# Patient Record
Sex: FEMALE | Race: WHITE | NOT HISPANIC OR LATINO | Employment: OTHER | URBAN - METROPOLITAN AREA
[De-identification: names, ages, dates, MRNs, and addresses within clinical notes are randomized per-mention and may not be internally consistent; named-entity substitution may affect disease eponyms.]

---

## 2022-06-21 ENCOUNTER — HOSPITAL ENCOUNTER (EMERGENCY)
Facility: CLINIC | Age: 47
Discharge: HOME | End: 2022-06-21
Attending: EMERGENCY MEDICINE | Admitting: EMERGENCY MEDICINE
Payer: COMMERCIAL

## 2022-06-21 VITALS
TEMPERATURE: 96.7 F | DIASTOLIC BLOOD PRESSURE: 94 MMHG | RESPIRATION RATE: 18 BRPM | SYSTOLIC BLOOD PRESSURE: 138 MMHG | HEART RATE: 104 BPM | OXYGEN SATURATION: 99 %

## 2022-06-21 DIAGNOSIS — R07.9 CHEST PAIN, UNSPECIFIED TYPE: ICD-10-CM

## 2022-06-21 PROBLEM — E03.9 HYPOTHYROIDISM (ACQUIRED): Status: ACTIVE | Noted: 2018-05-08

## 2022-06-21 PROBLEM — N92.0 MENORRHAGIA WITH REGULAR CYCLE: Status: ACTIVE | Noted: 2022-06-21

## 2022-06-21 PROBLEM — K66.0 LOWER ABDOMINAL ADHESIONS: Status: ACTIVE | Noted: 2022-06-21

## 2022-06-21 PROBLEM — R73.03 PREDIABETES: Status: ACTIVE | Noted: 2018-05-08

## 2022-06-21 PROBLEM — D25.1 INTRAMURAL LEIOMYOMA OF UTERUS: Status: ACTIVE | Noted: 2020-11-17

## 2022-06-21 LAB
ALBUMIN SERPL-MCNC: 3.4 G/DL (ref 3.4–5)
ALP SERPL-CCNC: 73 U/L (ref 40–150)
ALT SERPL W P-5'-P-CCNC: 16 U/L (ref 0–50)
ANION GAP SERPL CALCULATED.3IONS-SCNC: 6 MMOL/L (ref 3–14)
APTT PPP: 28 SECONDS (ref 22–38)
AST SERPL W P-5'-P-CCNC: 11 U/L (ref 0–45)
BASOPHILS # BLD AUTO: 0.1 10E3/UL (ref 0–0.2)
BASOPHILS NFR BLD AUTO: 1 %
BILIRUB SERPL-MCNC: 0.2 MG/DL (ref 0.2–1.3)
BUN SERPL-MCNC: 14 MG/DL (ref 7–30)
CALCIUM SERPL-MCNC: 9 MG/DL (ref 8.5–10.1)
CHLORIDE BLD-SCNC: 105 MMOL/L (ref 94–109)
CO2 SERPL-SCNC: 28 MMOL/L (ref 20–32)
CREAT SERPL-MCNC: 0.72 MG/DL (ref 0.52–1.04)
EOSINOPHIL # BLD AUTO: 0.1 10E3/UL (ref 0–0.7)
EOSINOPHIL NFR BLD AUTO: 1 %
ERYTHROCYTE [DISTWIDTH] IN BLOOD BY AUTOMATED COUNT: 13.6 % (ref 10–15)
GFR SERPL CREATININE-BSD FRML MDRD: >90 ML/MIN/1.73M2
GLUCOSE BLD-MCNC: 115 MG/DL (ref 70–99)
GLUCOSE BLDC GLUCOMTR-MCNC: 116 MG/DL (ref 70–99)
HCT VFR BLD AUTO: 44.2 % (ref 35–47)
HGB BLD-MCNC: 14.6 G/DL (ref 11.7–15.7)
IMM GRANULOCYTES # BLD: 0 10E3/UL
IMM GRANULOCYTES NFR BLD: 0 %
INR PPP: 0.99 (ref 0.85–1.15)
LYMPHOCYTES # BLD AUTO: 3.3 10E3/UL (ref 0.8–5.3)
LYMPHOCYTES NFR BLD AUTO: 33 %
MCH RBC QN AUTO: 28.8 PG (ref 26.5–33)
MCHC RBC AUTO-ENTMCNC: 33 G/DL (ref 31.5–36.5)
MCV RBC AUTO: 87 FL (ref 78–100)
MONOCYTES # BLD AUTO: 0.6 10E3/UL (ref 0–1.3)
MONOCYTES NFR BLD AUTO: 6 %
NEUTROPHILS # BLD AUTO: 5.9 10E3/UL (ref 1.6–8.3)
NEUTROPHILS NFR BLD AUTO: 59 %
NRBC # BLD AUTO: 0 10E3/UL
NRBC BLD AUTO-RTO: 0 /100
PLATELET # BLD AUTO: 322 10E3/UL (ref 150–450)
POTASSIUM BLD-SCNC: 3.7 MMOL/L (ref 3.4–5.3)
PROT SERPL-MCNC: 7.7 G/DL (ref 6.8–8.8)
RBC # BLD AUTO: 5.07 10E6/UL (ref 3.8–5.2)
SODIUM SERPL-SCNC: 139 MMOL/L (ref 133–144)
TROPONIN I SERPL HS-MCNC: 6 NG/L
TSH SERPL DL<=0.005 MIU/L-ACNC: 3.36 MU/L (ref 0.4–4)
WBC # BLD AUTO: 10 10E3/UL (ref 4–11)

## 2022-06-21 PROCEDURE — 85610 PROTHROMBIN TIME: CPT | Performed by: EMERGENCY MEDICINE

## 2022-06-21 PROCEDURE — 84443 ASSAY THYROID STIM HORMONE: CPT | Performed by: EMERGENCY MEDICINE

## 2022-06-21 PROCEDURE — 93005 ELECTROCARDIOGRAM TRACING: CPT | Performed by: EMERGENCY MEDICINE

## 2022-06-21 PROCEDURE — 99284 EMERGENCY DEPT VISIT MOD MDM: CPT | Performed by: EMERGENCY MEDICINE

## 2022-06-21 PROCEDURE — 80053 COMPREHEN METABOLIC PANEL: CPT | Performed by: EMERGENCY MEDICINE

## 2022-06-21 PROCEDURE — 93010 ELECTROCARDIOGRAM REPORT: CPT | Performed by: EMERGENCY MEDICINE

## 2022-06-21 PROCEDURE — 85730 THROMBOPLASTIN TIME PARTIAL: CPT | Performed by: EMERGENCY MEDICINE

## 2022-06-21 PROCEDURE — 36415 COLL VENOUS BLD VENIPUNCTURE: CPT | Performed by: EMERGENCY MEDICINE

## 2022-06-21 PROCEDURE — 84484 ASSAY OF TROPONIN QUANT: CPT | Performed by: EMERGENCY MEDICINE

## 2022-06-21 PROCEDURE — 99284 EMERGENCY DEPT VISIT MOD MDM: CPT | Mod: 25 | Performed by: EMERGENCY MEDICINE

## 2022-06-21 PROCEDURE — 85025 COMPLETE CBC W/AUTO DIFF WBC: CPT | Performed by: EMERGENCY MEDICINE

## 2022-06-21 RX ORDER — KETOROLAC TROMETHAMINE 15 MG/ML
15 INJECTION, SOLUTION INTRAMUSCULAR; INTRAVENOUS ONCE
Status: DISCONTINUED | OUTPATIENT
Start: 2022-06-21 | End: 2022-06-22 | Stop reason: HOSPADM

## 2022-06-21 RX ORDER — IOPAMIDOL 755 MG/ML
70 INJECTION, SOLUTION INTRAVASCULAR ONCE
Status: DISCONTINUED | OUTPATIENT
Start: 2022-06-21 | End: 2022-06-22 | Stop reason: HOSPADM

## 2022-06-21 RX ORDER — SODIUM CHLORIDE 9 MG/ML
INJECTION, SOLUTION INTRAVENOUS CONTINUOUS PRN
Status: DISCONTINUED | OUTPATIENT
Start: 2022-06-21 | End: 2022-06-22 | Stop reason: HOSPADM

## 2022-06-21 ASSESSMENT — ENCOUNTER SYMPTOMS
ACTIVITY CHANGE: 1
APPETITE CHANGE: 0
CONFUSION: 0
HEADACHES: 0
NERVOUS/ANXIOUS: 0
CHILLS: 1
ABDOMINAL PAIN: 0
SORE THROAT: 0
SHORTNESS OF BREATH: 1
FEVER: 0
DIARRHEA: 0
COUGH: 0
PALPITATIONS: 0
HYPERACTIVE: 1
SLEEP DISTURBANCE: 1
NAUSEA: 0
FACIAL ASYMMETRY: 0
BACK PAIN: 0
DIAPHORESIS: 1
LIGHT-HEADEDNESS: 0
NUMBNESS: 1
VOMITING: 0
RHINORRHEA: 0
CHEST TIGHTNESS: 0
SPEECH DIFFICULTY: 1
DIZZINESS: 0
FATIGUE: 0
WEAKNESS: 0
DYSURIA: 0

## 2022-06-22 LAB
HOLD SPECIMEN: NORMAL

## 2022-06-22 NOTE — ED TRIAGE NOTES
Patient states that she is on day 4 of left sided chest pain. Hx of stroke and stents placed. Pt is currently prescribed blood thinners but refuses to take them. 4 baby aspirin taken a few hours ago and Topamax taken. Slurred speech started around 2245. Other sx started at approximately 2100.     Triage Assessment     Row Name 06/21/22 4335       Triage Assessment (Adult)    Airway WDL WDL       Respiratory WDL    Respiratory WDL WDL       Skin Circulation/Temperature WDL    Skin Circulation/Temperature WDL WDL       Cardiac WDL    Cardiac WDL X;chest pain       Chest Pain Assessment    Chest Pain Location anterior chest, left    Chest Pain Radiation arm    Character sharp;pressure    Duration constant, worse with breathing       Peripheral/Neurovascular WDL    Peripheral Neurovascular WDL WDL       Cognitive/Neuro/Behavioral WDL    Cognitive/Neuro/Behavioral WDL X    Speech slurred       Andrew Coma Scale    Best Eye Response 4-->(E4) spontaneous    Best Motor Response 6-->(M6) obeys commands    Best Verbal Response 5-->(V5) oriented    Andrew Coma Scale Score 15

## 2022-06-22 NOTE — ED PROVIDER NOTES
"  History     Chief Complaint   Patient presents with     Chest Pain     HPI  Shea Hoyos is a 47 year old female with a history of bipolar disorder, GERD, obesity, asthma and smoking, and coronary disease with stents earlier this year presenting for evaluation of roughly 4 to 5 days of left-sided chest pain with dyspnea.  Patient admits to being off of her prescribed medications for quite some time.  She reports she is a previous drug addict and \"does not like to take pills\".  She has been off all of her medications including omeprazole, lithium, levothyroxine, and prazosin.  Patient reports she has been having left shoulder pain for several weeks which seems to be worse currently.  Pain is in her left shoulder and back area as well as her left-sided chest on her breast.  Pain has improved when she would take aspirin at home or ibuprofen or Tylenol.  Denies cough, fevers, or chills.  Reports continuous pain and difficulty breathing which is worse with movement or deep inspiration.  Denies history of blood clots.  Denies lower extremity pain or swelling.  Did notice some slurring in her speech and left-sided weakness over the past few days.  Slurred speech has been intermittent but seem to worsen again before arrival in the ED.  Patient last took 324 mg chewable aspirin just before coming to the ED.  Patient reports she was feeling numbness in her left arm but that is now improved and back to normal.  Patient reportedly has not been sleeping much for the past 4 to 5 days.  She reports at most 8 hours of sleep over this timeframe.  Friend who is with her reports she has been very active and focused and has barely slept.    Allergies:  Allergies   Allergen Reactions     Augmentin Rash     Vomiting     Cipro [Ciprofloxacin] Rash     Codeine Rash     Morphine Rash     Vicodin [Acetaminophen] Rash     Zithromax [Azithromycin Dihydrate] Rash       Problem List:    Patient Active Problem List    Diagnosis Date Noted "     Lower abdominal adhesions 06/21/2022     Priority: Medium     Menorrhagia with regular cycle 06/21/2022     Priority: Medium     Intramural leiomyoma of uterus 11/17/2020     Priority: Medium     Hypothyroidism (acquired) 05/08/2018     Priority: Medium     Prediabetes 05/08/2018     Priority: Medium     Major depressive disorder, recurrent episode, severe with mood-congruent psychotic features (H) 11/13/2015     Priority: Medium     Post traumatic stress disorder (PTSD) 11/13/2015     Priority: Medium     Adenoma of left adrenal gland 07/31/2015     Priority: Medium     Multinodular goiter 04/19/2014     Priority: Medium     Mild intermittent asthma 02/18/2014     Priority: Medium     Migraine with aura 02/07/2013     Priority: Medium     Nerve damage 09/12/2012     Priority: Medium     Anxiety state 07/13/2012     Priority: Medium     Urinary incontinence 09/26/2011     Priority: Medium     Bipolar I disorder (H) 04/01/2011     Priority: Medium     GERD (gastroesophageal reflux disease) 04/01/2011     Priority: Medium     CARDIOVASCULAR SCREENING; LDL GOAL LESS THAN 160 10/31/2010     Priority: Medium     Adult BMI >=40 kg/sq m 02/26/2010     Priority: Medium     Tobacco abuse 02/26/2010     Priority: Medium     History of contraception 02/26/2010     Priority: Medium     Failed tubal ligation. Pt had tubal ligation in 2001. Recent pregnancy in 2009 that ended in miscarriage. Pt now using condoms and spermicide. Undecided about future fertility plans at this time, considering tubal reversal.          Past Medical History:    Past Medical History:   Diagnosis Date     Bipolar affective disorder (H)      Bronchitis      Depression      GERD (gastroesophageal reflux disease)      Increased BMI      reflux        Past Surgical History:    Past Surgical History:   Procedure Laterality Date     CHOLECYSTECTOMY, LAPOROSCOPIC       HC DILATION/CURETTAGE DIAG/THER NON OB       HC TOOTH EXTRACTION W/FORCEP       TUBAL  LIGATION       ZZC APPENDECTOMY       ZZC  DELIVERY ONLY  , 2001    x2       Family History:    Family History   Problem Relation Age of Onset     Respiratory Mother         copd     Hypertension Mother      Allergies Mother      Diabetes Mother      Obesity Mother      Psychotic Disorder Mother         bipolar     Hypertension Father      Allergies Father      C.A.D. Father      Diabetes Father      Alcohol/Drug Father      C.A.D. Maternal Grandmother      Cerebrovascular Disease Maternal Grandmother      Alcohol/Drug Maternal Grandmother      Alzheimer Disease Maternal Grandmother      Arthritis Maternal Grandmother      Blood Disease Maternal Grandmother      C.A.D. Maternal Grandfather      Cerebrovascular Disease Maternal Grandfather      Alcohol/Drug Maternal Grandfather      C.A.D. Paternal Grandmother      Cerebrovascular Disease Paternal Grandmother      Breast Cancer Paternal Grandmother      Alcohol/Drug Paternal Grandmother      C.A.D. Paternal Grandfather      Cerebrovascular Disease Paternal Grandfather      Alcohol/Drug Paternal Grandfather      Asthma Sister      Depression Sister         bipolar     Thyroid Disease Sister      Cancer Sister         ovarian and cervical     Depression Sister         bipolar       Social History:  Marital Status:  Single [1]  Social History     Tobacco Use     Smoking status: Current Every Day Smoker     Packs/day: 0.50     Years: 22.00     Pack years: 11.00     Types: Cigarettes   Substance Use Topics     Alcohol use: Yes     Comment: occasional, holidays     Drug use: No        Medications:    IBUPROFEN 600 MG OR TABS  LEXAPRO 20 MG OR TABS  MULTIVITAMIN TABS   OR  NEXIUM 20 MG OR CPDR          Review of Systems   Constitutional: Positive for activity change (Increased activity), chills and diaphoresis. Negative for appetite change, fatigue and fever.        Intermittent sweats and chills   HENT: Negative for congestion, rhinorrhea and sore throat.     Respiratory: Positive for shortness of breath. Negative for cough and chest tightness.    Cardiovascular: Positive for chest pain. Negative for palpitations and leg swelling.   Gastrointestinal: Negative for abdominal pain, diarrhea, nausea and vomiting.   Genitourinary: Negative for decreased urine volume and dysuria.   Musculoskeletal: Negative for back pain.   Skin: Negative for rash.   Neurological: Positive for speech difficulty (Noticed some slurred speech tonight) and numbness (Intermittent left-sided). Negative for dizziness, syncope, facial asymmetry, weakness, light-headedness and headaches.   Psychiatric/Behavioral: Positive for sleep disturbance (Not sleeping for several days). Negative for confusion. The patient is hyperactive. The patient is not nervous/anxious.    All other systems reviewed and are negative.      Physical Exam   BP: (!) 138/94  Pulse: 104  Temp: (!) 96.7  F (35.9  C)  Resp: 18  SpO2: 99 %      Physical Exam  Vitals and nursing note reviewed.   Constitutional:       Appearance: She is obese. She is not diaphoretic.      Comments: Awake and alert, able to provide much of the history, friend provides some history as well.   HENT:      Head: Normocephalic.      Nose: Nose normal.      Mouth/Throat:      Pharynx: No posterior oropharyngeal erythema.      Comments: Mildly dry oral mucous membranes  Eyes:      Conjunctiva/sclera: Conjunctivae normal.   Cardiovascular:      Rate and Rhythm: Normal rate and regular rhythm.      Pulses: Normal pulses.   Pulmonary:      Effort: Pulmonary effort is normal.      Breath sounds: Wheezing (faint) present. No rhonchi.   Abdominal:      Palpations: Abdomen is soft.      Tenderness: There is no abdominal tenderness. There is no guarding.   Musculoskeletal:      Cervical back: Normal range of motion.      Right lower leg: No edema.      Left lower leg: No edema.   Skin:     General: Skin is warm and dry.      Capillary Refill: Capillary refill takes  less than 2 seconds.   Neurological:      Mental Status: She is alert and oriented to person, place, and time.   Psychiatric:         Mood and Affect: Mood is anxious.         Speech: Speech is rapid and pressured.         Behavior: Behavior is hyperactive. Behavior is cooperative.         ED Course                 Procedures              EKG Interpretation:      Interpreted by Suhas Arcos MD  Time reviewed: 2312  Symptoms at time of EKG: chest pain   Rhythm: normal sinus   Rate: normal  Axis: normal  Ectopy: unifocal pvc  Conduction: normal  ST Segments/ T Waves: No ST-T wave changes  Q Waves: none  Comparison to prior: No old EKG available    Clinical Impression: Sinus rhythm with a PVC                 Results for orders placed or performed during the hospital encounter of 06/21/22 (from the past 24 hour(s))   [place] Draw    Narrative    The following orders were created for panel order [place] Draw.  Procedure                               Abnormality         Status                     ---------                               -----------         ------                     Extra Red Top Tube[[id number]]                               In process                 Extra Green Top (Lithium...[[id number]]                      In process                 Extra Purple Top Tube[[id number]]                            In process                   Please view results for these tests on the individual orders.   CBC with platelets, differential    Narrative    The following orders were created for panel order CBC with platelets, differential.  Procedure                               Abnormality         Status                     ---------                               -----------         ------                     CBC with platelets and d...[[id number]]                      Final result                 Please view results for these tests on the individual orders.   Comprehensive metabolic panel   Result Value Ref Range     Sodium 139 133 - 144 mmol/L    Potassium 3.7 3.4 - 5.3 mmol/L    Chloride 105 94 - 109 mmol/L    Carbon Dioxide (CO2)      Anion Gap      Urea Nitrogen      Creatinine      Calcium      Glucose      Alkaline Phosphatase      AST      ALT      Protein Total      Albumin      Bilirubin Total      GFR Estimate     CBC with platelets and differential   Result Value Ref Range    WBC Count 10.0 4.0 - 11.0 10e3/uL    RBC Count 5.07 3.80 - 5.20 10e6/uL    Hemoglobin 14.6 11.7 - 15.7 g/dL    Hematocrit 44.2 35.0 - 47.0 %    MCV 87 78 - 100 fL    MCH 28.8 26.5 - 33.0 pg    MCHC 33.0 31.5 - 36.5 g/dL    RDW 13.6 10.0 - 15.0 %    Platelet Count 322 150 - 450 10e3/uL    % Neutrophils 59 %    % Lymphocytes 33 %    % Monocytes 6 %    % Eosinophils 1 %    % Basophils 1 %    % Immature Granulocytes 0 %    NRBCs per 100 WBC 0 <1 /100    Absolute Neutrophils 5.9 1.6 - 8.3 10e3/uL    Absolute Lymphocytes 3.3 0.8 - 5.3 10e3/uL    Absolute Monocytes 0.6 0.0 - 1.3 10e3/uL    Absolute Eosinophils 0.1 0.0 - 0.7 10e3/uL    Absolute Basophils 0.1 0.0 - 0.2 10e3/uL    Absolute Immature Granulocytes 0.0 <=0.4 10e3/uL    Absolute NRBCs 0.0 10e3/uL   INR   Result Value Ref Range    INR 0.99 0.85 - 1.15   Partial thromboplastin time   Result Value Ref Range    aPTT 28 22 - 38 Seconds   Glucose by meter   Result Value Ref Range    GLUCOSE BY METER POCT 116 (H) 70 - 99 mg/dL       Medications   sodium chloride 0.9% infusion (has no administration in time range)   iopamidol (ISOVUE-370) solution 70 mL (has no administration in time range)   sodium chloride 0.9 % bag 500mL for CT scan flush use (has no administration in time range)   pantoprazole (PROTONIX) IV push injection 40 mg (has no administration in time range)   ketorolac (TORADOL) injection 15 mg (has no administration in time range)   lactated ringers BOLUS 1,000 mL (has no administration in time range)     11:35 PM; advised by nurse the patient wants to leave.  Patient reassessed at  bedside.  Patient is visibly anxious and stating she wants to leave.  She states her chest and arm pain is improved and she cannot stay.  She admits that jesse bipolar is acting up and she needs to get up and leave.  Patient was offered medications to help with her symptoms but she refused.  She is awake and alert.  Patient is oriented and appears to have capacity and understanding of her current medical condition.  I advised her that we are unable to rule out a heart attack or stroke or any other potential cause of her symptoms at this point.  She expressed understanding as did her friend who is with her.  Patient advised that I think she should stay to complete the work-up to be sure there is nothing more serious going on but patient again insisted on leaving.  Patient advised that she is welcome to return at any time and I encouraged her to return if she has any further concerns.  Initial labs at this point appear normal from what is available.  Troponin still pending.  No white count or left shift.  Normal sodium, chloride, potassium.  Imaging not obtained.    Assessments & Plan (with Medical Decision Making)  47-year-old female with history of bipolar, GERD, obesity, asthma, smoking, coronary disease presenting for evaluation of several days of chest pain with dyspnea and anxiety.  Patient has not been sleeping and appears to be in a manic phase.  She is not taking her medications and has been having chest discomfort which appears reproducible with physical movement or palpation.  Possibly musculoskeletal.  Screening EKG did not show evidence of an acute heart attack but did show a PVC.  Became more anxious while in the ED and insisted on leaving.  Patient was offered medication to help with her symptoms but she refused.  I asked if there was anything further I can do to help her to feel more comfortable so that we could complete her work-up to be sure there is nothing more serious going on but she declined.   Patient is here with her friend who agrees to stay with her and bring her back should her symptoms worsen or any new or concerning symptoms develop.  Patient leaving AGAINST MEDICAL ADVICE.     I have reviewed the nursing notes.    I have reviewed the findings, diagnosis, plan and need for follow up with the patient.       New Prescriptions    No medications on file       Final diagnoses:   Chest pain, unspecified type       6/21/2022   [facility] EMERGENCY DEPT     Arcos, Suhas Brown MD  06/21/22 [id number]

## 2022-06-22 NOTE — ED TRIAGE NOTES
Left-sided chest pain that radiates down left arm for 4 days and slurred speech for 1 day.  Patient has history of CVA's, chiari malformation.     Triage Assessment     Row Name 06/21/22 2755       Triage Assessment (Adult)    Airway WDL WDL       Respiratory WDL    Respiratory WDL WDL       Skin Circulation/Temperature WDL    Skin Circulation/Temperature WDL WDL       Cardiac WDL    Cardiac WDL X;chest pain       Chest Pain Assessment    Chest Pain Location anterior chest, left    Chest Pain Radiation arm    Character sharp;pressure    Duration constant, worse with breathing       Peripheral/Neurovascular WDL    Peripheral Neurovascular WDL WDL       Cognitive/Neuro/Behavioral WDL    Cognitive/Neuro/Behavioral WDL X    Speech slurred       Ashok Coma Scale    Best Eye Response 4-->(E4) spontaneous    Best Motor Response 6-->(M6) obeys commands    Best Verbal Response 5-->(V5) oriented    Ashok Coma Scale Score 15

## 2024-10-03 ENCOUNTER — HOSPITAL ENCOUNTER (EMERGENCY)
Facility: CLINIC | Age: 49
Discharge: HOME | End: 2024-10-04
Attending: EMERGENCY MEDICINE | Admitting: EMERGENCY MEDICINE

## 2024-10-03 DIAGNOSIS — N39.0 ACUTE UTI: ICD-10-CM

## 2024-10-03 DIAGNOSIS — S22.31XA CLOSED FRACTURE OF ONE RIB OF RIGHT SIDE, INITIAL ENCOUNTER: ICD-10-CM

## 2024-10-03 LAB
ALBUMIN SERPL BCG-MCNC: 3.7 G/DL (ref 3.5–5.2)
ALBUMIN UR-MCNC: NEGATIVE MG/DL
ALP SERPL-CCNC: 73 U/L (ref 40–150)
ALT SERPL W P-5'-P-CCNC: 11 U/L (ref 0–50)
AMORPH CRY #/AREA URNS HPF: ABNORMAL /HPF
ANION GAP SERPL CALCULATED.3IONS-SCNC: 15 MMOL/L (ref 7–15)
APPEARANCE UR: ABNORMAL
AST SERPL W P-5'-P-CCNC: 13 U/L (ref 0–45)
BACTERIA #/AREA URNS HPF: ABNORMAL /HPF
BASOPHILS # BLD AUTO: 0.1 10E3/UL (ref 0–0.2)
BASOPHILS NFR BLD AUTO: 1 %
BILIRUB SERPL-MCNC: 0.2 MG/DL
BILIRUB UR QL STRIP: NEGATIVE
BUN SERPL-MCNC: 13.8 MG/DL (ref 6–20)
CALCIUM SERPL-MCNC: 8.7 MG/DL (ref 8.8–10.4)
CHLORIDE SERPL-SCNC: 99 MMOL/L (ref 98–107)
COLOR UR AUTO: YELLOW
CREAT SERPL-MCNC: 0.82 MG/DL (ref 0.51–0.95)
D DIMER PPP FEU-MCNC: 0.45 UG/ML FEU (ref 0–0.5)
EGFRCR SERPLBLD CKD-EPI 2021: 88 ML/MIN/1.73M2
EOSINOPHIL # BLD AUTO: 0.1 10E3/UL (ref 0–0.7)
EOSINOPHIL NFR BLD AUTO: 1 %
ERYTHROCYTE [DISTWIDTH] IN BLOOD BY AUTOMATED COUNT: 13.2 % (ref 10–15)
GLUCOSE SERPL-MCNC: 121 MG/DL (ref 70–99)
GLUCOSE UR STRIP-MCNC: NEGATIVE MG/DL
HCO3 SERPL-SCNC: 22 MMOL/L (ref 22–29)
HCT VFR BLD AUTO: 38.8 % (ref 35–47)
HGB BLD-MCNC: 12.5 G/DL (ref 11.7–15.7)
HGB UR QL STRIP: ABNORMAL
IMM GRANULOCYTES # BLD: 0 10E3/UL
IMM GRANULOCYTES NFR BLD: 0 %
KETONES UR STRIP-MCNC: NEGATIVE MG/DL
LEUKOCYTE ESTERASE UR QL STRIP: ABNORMAL
LYMPHOCYTES # BLD AUTO: 1.9 10E3/UL (ref 0.8–5.3)
LYMPHOCYTES NFR BLD AUTO: 20 %
MCH RBC QN AUTO: 28.8 PG (ref 26.5–33)
MCHC RBC AUTO-ENTMCNC: 32.2 G/DL (ref 31.5–36.5)
MCV RBC AUTO: 89 FL (ref 78–100)
MONOCYTES # BLD AUTO: 0.7 10E3/UL (ref 0–1.3)
MONOCYTES NFR BLD AUTO: 8 %
MUCOUS THREADS #/AREA URNS LPF: PRESENT /LPF
NEUTROPHILS # BLD AUTO: 6.6 10E3/UL (ref 1.6–8.3)
NEUTROPHILS NFR BLD AUTO: 70 %
NITRATE UR QL: POSITIVE
NRBC # BLD AUTO: 0 10E3/UL
NRBC BLD AUTO-RTO: 0 /100
PH UR STRIP: 6 [PH] (ref 5–7)
PLATELET # BLD AUTO: 407 10E3/UL (ref 150–450)
POTASSIUM SERPL-SCNC: 3.8 MMOL/L (ref 3.4–5.3)
PROT SERPL-MCNC: 7 G/DL (ref 6.4–8.3)
RBC # BLD AUTO: 4.34 10E6/UL (ref 3.8–5.2)
RBC URINE: 6 /HPF
SODIUM SERPL-SCNC: 136 MMOL/L (ref 135–145)
SP GR UR STRIP: 1.01 (ref 1–1.03)
SQUAMOUS EPITHELIAL: 3 /HPF
TROPONIN T SERPL HS-MCNC: <6 NG/L
UROBILINOGEN UR STRIP-MCNC: NORMAL MG/DL
WBC # BLD AUTO: 9.4 10E3/UL (ref 4–11)
WBC CLUMPS #/AREA URNS HPF: PRESENT /HPF
WBC URINE: >182 /HPF

## 2024-10-03 PROCEDURE — 93005 ELECTROCARDIOGRAM TRACING: CPT

## 2024-10-03 PROCEDURE — 36415 COLL VENOUS BLD VENIPUNCTURE: CPT | Performed by: EMERGENCY MEDICINE

## 2024-10-03 PROCEDURE — 85379 FIBRIN DEGRADATION QUANT: CPT | Performed by: EMERGENCY MEDICINE

## 2024-10-03 PROCEDURE — 99285 EMERGENCY DEPT VISIT HI MDM: CPT | Mod: 25

## 2024-10-03 PROCEDURE — 87086 URINE CULTURE/COLONY COUNT: CPT | Performed by: EMERGENCY MEDICINE

## 2024-10-03 PROCEDURE — 85025 COMPLETE CBC W/AUTO DIFF WBC: CPT | Performed by: EMERGENCY MEDICINE

## 2024-10-03 PROCEDURE — 80053 COMPREHEN METABOLIC PANEL: CPT | Performed by: EMERGENCY MEDICINE

## 2024-10-03 PROCEDURE — 87186 SC STD MICRODIL/AGAR DIL: CPT | Performed by: EMERGENCY MEDICINE

## 2024-10-03 PROCEDURE — 84484 ASSAY OF TROPONIN QUANT: CPT | Performed by: EMERGENCY MEDICINE

## 2024-10-03 PROCEDURE — 81001 URINALYSIS AUTO W/SCOPE: CPT | Performed by: EMERGENCY MEDICINE

## 2024-10-03 PROCEDURE — 84703 CHORIONIC GONADOTROPIN ASSAY: CPT | Performed by: EMERGENCY MEDICINE

## 2024-10-03 ASSESSMENT — ACTIVITIES OF DAILY LIVING (ADL): ADLS_ACUITY_SCORE: 35

## 2024-10-04 ENCOUNTER — APPOINTMENT (OUTPATIENT)
Dept: CT IMAGING | Facility: CLINIC | Age: 49
End: 2024-10-04
Attending: EMERGENCY MEDICINE

## 2024-10-04 ENCOUNTER — APPOINTMENT (OUTPATIENT)
Dept: GENERAL RADIOLOGY | Facility: CLINIC | Age: 49
End: 2024-10-04
Attending: EMERGENCY MEDICINE

## 2024-10-04 VITALS
RESPIRATION RATE: 16 BRPM | SYSTOLIC BLOOD PRESSURE: 121 MMHG | DIASTOLIC BLOOD PRESSURE: 86 MMHG | TEMPERATURE: 97.6 F | OXYGEN SATURATION: 99 % | HEART RATE: 89 BPM

## 2024-10-04 LAB
ATRIAL RATE - MUSE: 101 BPM
DIASTOLIC BLOOD PRESSURE - MUSE: NORMAL MMHG
HCG SERPL QL: NEGATIVE
INTERPRETATION ECG - MUSE: NORMAL
P AXIS - MUSE: 10 DEGREES
PR INTERVAL - MUSE: 136 MS
QRS DURATION - MUSE: 80 MS
QT - MUSE: 354 MS
QTC - MUSE: 459 MS
R AXIS - MUSE: 10 DEGREES
SYSTOLIC BLOOD PRESSURE - MUSE: NORMAL MMHG
T AXIS - MUSE: 73 DEGREES
VENTRICULAR RATE- MUSE: 101 BPM

## 2024-10-04 PROCEDURE — 71046 X-RAY EXAM CHEST 2 VIEWS: CPT

## 2024-10-04 PROCEDURE — 250N000013 HC RX MED GY IP 250 OP 250 PS 637: Performed by: EMERGENCY MEDICINE

## 2024-10-04 PROCEDURE — 74177 CT ABD & PELVIS W/CONTRAST: CPT

## 2024-10-04 PROCEDURE — 250N000011 HC RX IP 250 OP 636: Performed by: EMERGENCY MEDICINE

## 2024-10-04 RX ORDER — CEPHALEXIN 500 MG/1
500 CAPSULE ORAL 2 TIMES DAILY
Qty: 10 CAPSULE | Refills: 0 | Status: SHIPPED | OUTPATIENT
Start: 2024-10-04 | End: 2024-10-09

## 2024-10-04 RX ORDER — CEPHALEXIN 500 MG/1
500 CAPSULE ORAL ONCE
Status: COMPLETED | OUTPATIENT
Start: 2024-10-04 | End: 2024-10-04

## 2024-10-04 RX ORDER — IOPAMIDOL 755 MG/ML
500 INJECTION, SOLUTION INTRAVASCULAR ONCE
Status: COMPLETED | OUTPATIENT
Start: 2024-10-04 | End: 2024-10-04

## 2024-10-04 RX ADMIN — CEPHALEXIN 500 MG: 500 CAPSULE ORAL at 01:40

## 2024-10-04 RX ADMIN — IOPAMIDOL 100 ML: 755 INJECTION, SOLUTION INTRAVENOUS at 01:17

## 2024-10-04 ASSESSMENT — ACTIVITIES OF DAILY LIVING (ADL)
ADLS_ACUITY_SCORE: 35
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
## 2024-10-04 NOTE — ED NOTES
"Pt prepared for discharge, given discharge education, pt then telling this RN \"why am I not on a 72 hour hold, I never talked to the doctor about my mental health\". MD Krueger at bedside, pt saying \"if you put me on the street I will kill myself\". DEC eval and social work consult placed. Pt denies feeling suicidal at this time. Charge RN notified. Curtain open for visualization of patient.  "
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
## 2024-10-04 NOTE — ED PROVIDER NOTES
Spoke with DEC patient is appropriate to discharge.  I agree with this plan.    Police will be notified, and patient will be discharged to police custody.  Patient is aware of this plan.     Cindy Anand MD  10/04/24 9480
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
## 2024-10-05 LAB — BACTERIA UR CULT: ABNORMAL

## 2024-10-06 ENCOUNTER — TELEPHONE (OUTPATIENT)
Dept: NURSING | Facility: CLINIC | Age: 49
End: 2024-10-06

## 2024-10-06 NOTE — TELEPHONE ENCOUNTER
[facility]    Reason for call: Lab Result Notification     Lab Result (including Rx patient on, if applicable).  If culture, copy of lab report at bottom.  Lab Result: Final Urine Culture Report on 10/05/24  [facility] Emergency Dept discharge antibiotic prescribed: Cephalexin (Keflex) 500 mg capsule, 1 capsule (500 mg) by mouth 2 times daily for 5 days.   Bacterial Growth: >100,000 CFU/ML Escherichia coli [Susceptible to Cefazolin]  No change in treatment per [facility] ED lab result Urine Culture protocol.     Patient's current Symptoms:   NA, discharged from ED in police custody    RN Recommendations/Instructions per [facility] ED lab result protocol:   [facility] ED lab result protocol utilized: urine culture            Aroldo Frank RN

## 2025-07-12 ENCOUNTER — HOSPITAL ENCOUNTER (EMERGENCY)
Facility: CLINIC | Age: 50
Discharge: HOME | End: 2025-07-12
Attending: EMERGENCY MEDICINE | Admitting: EMERGENCY MEDICINE
Payer: COMMERCIAL

## 2025-07-12 ENCOUNTER — APPOINTMENT (OUTPATIENT)
Dept: GENERAL RADIOLOGY | Facility: CLINIC | Age: 50
End: 2025-07-12
Attending: EMERGENCY MEDICINE
Payer: COMMERCIAL

## 2025-07-12 VITALS
BODY MASS INDEX: 31.24 KG/M2 | DIASTOLIC BLOOD PRESSURE: 69 MMHG | RESPIRATION RATE: 20 BRPM | HEIGHT: 64 IN | SYSTOLIC BLOOD PRESSURE: 125 MMHG | OXYGEN SATURATION: 90 % | HEART RATE: 73 BPM | TEMPERATURE: 98.4 F | WEIGHT: 183 LBS

## 2025-07-12 DIAGNOSIS — Z87.898 HISTORY OF SUBSTANCE USE DISORDER: ICD-10-CM

## 2025-07-12 DIAGNOSIS — R07.9 CHEST PAIN, UNSPECIFIED TYPE: ICD-10-CM

## 2025-07-12 LAB
ANION GAP SERPL CALCULATED.3IONS-SCNC: 11 MMOL/L (ref 7–15)
BASOPHILS # BLD AUTO: 0.1 10E3/UL (ref 0–0.2)
BASOPHILS NFR BLD AUTO: 1 %
BUN SERPL-MCNC: 13.2 MG/DL (ref 6–20)
CALCIUM SERPL-MCNC: 8.8 MG/DL (ref 8.8–10.4)
CHLORIDE SERPL-SCNC: 102 MMOL/L (ref 98–107)
CREAT SERPL-MCNC: 0.76 MG/DL (ref 0.51–0.95)
D DIMER PPP FEU-MCNC: 0.27 UG/ML FEU (ref 0–0.5)
EGFRCR SERPLBLD CKD-EPI 2021: >90 ML/MIN/1.73M2
EOSINOPHIL # BLD AUTO: 0.1 10E3/UL (ref 0–0.7)
EOSINOPHIL NFR BLD AUTO: 2 %
ERYTHROCYTE [DISTWIDTH] IN BLOOD BY AUTOMATED COUNT: 15.9 % (ref 10–15)
GLUCOSE SERPL-MCNC: 119 MG/DL (ref 70–99)
HCO3 SERPL-SCNC: 23 MMOL/L (ref 22–29)
HCT VFR BLD AUTO: 41.6 % (ref 35–47)
HGB BLD-MCNC: 13.2 G/DL (ref 11.7–15.7)
HOLD SPECIMEN: NORMAL
IMM GRANULOCYTES # BLD: 0 10E3/UL
IMM GRANULOCYTES NFR BLD: 0 %
LYMPHOCYTES # BLD AUTO: 1.6 10E3/UL (ref 0.8–5.3)
LYMPHOCYTES NFR BLD AUTO: 23 %
MCH RBC QN AUTO: 28.4 PG (ref 26.5–33)
MCHC RBC AUTO-ENTMCNC: 31.7 G/DL (ref 31.5–36.5)
MCV RBC AUTO: 90 FL (ref 78–100)
MONOCYTES # BLD AUTO: 0.6 10E3/UL (ref 0–1.3)
MONOCYTES NFR BLD AUTO: 8 %
NEUTROPHILS # BLD AUTO: 4.7 10E3/UL (ref 1.6–8.3)
NEUTROPHILS NFR BLD AUTO: 66 %
NRBC # BLD AUTO: 0 10E3/UL
NRBC BLD AUTO-RTO: 0 /100
PLATELET # BLD AUTO: 295 10E3/UL (ref 150–450)
POTASSIUM SERPL-SCNC: 4.7 MMOL/L (ref 3.4–5.3)
RBC # BLD AUTO: 4.64 10E6/UL (ref 3.8–5.2)
SODIUM SERPL-SCNC: 136 MMOL/L (ref 135–145)
TROPONIN T SERPL HS-MCNC: <6 NG/L
TROPONIN T SERPL HS-MCNC: <6 NG/L
WBC # BLD AUTO: 7.1 10E3/UL (ref 4–11)

## 2025-07-12 PROCEDURE — 93005 ELECTROCARDIOGRAM TRACING: CPT

## 2025-07-12 PROCEDURE — 80048 BASIC METABOLIC PNL TOTAL CA: CPT | Performed by: EMERGENCY MEDICINE

## 2025-07-12 PROCEDURE — 84484 ASSAY OF TROPONIN QUANT: CPT | Performed by: EMERGENCY MEDICINE

## 2025-07-12 PROCEDURE — 85025 COMPLETE CBC W/AUTO DIFF WBC: CPT | Performed by: EMERGENCY MEDICINE

## 2025-07-12 PROCEDURE — 99285 EMERGENCY DEPT VISIT HI MDM: CPT | Performed by: EMERGENCY MEDICINE

## 2025-07-12 PROCEDURE — 71046 X-RAY EXAM CHEST 2 VIEWS: CPT

## 2025-07-12 PROCEDURE — 36415 COLL VENOUS BLD VENIPUNCTURE: CPT | Performed by: EMERGENCY MEDICINE

## 2025-07-12 PROCEDURE — 93010 ELECTROCARDIOGRAM REPORT: CPT | Performed by: EMERGENCY MEDICINE

## 2025-07-12 PROCEDURE — 258N000003 HC RX IP 258 OP 636: Performed by: EMERGENCY MEDICINE

## 2025-07-12 PROCEDURE — 250N000013 HC RX MED GY IP 250 OP 250 PS 637: Performed by: EMERGENCY MEDICINE

## 2025-07-12 PROCEDURE — 85379 FIBRIN DEGRADATION QUANT: CPT | Performed by: EMERGENCY MEDICINE

## 2025-07-12 PROCEDURE — 96361 HYDRATE IV INFUSION ADD-ON: CPT

## 2025-07-12 PROCEDURE — 99285 EMERGENCY DEPT VISIT HI MDM: CPT | Mod: 25

## 2025-07-12 PROCEDURE — 96360 HYDRATION IV INFUSION INIT: CPT

## 2025-07-12 RX ORDER — SODIUM CHLORIDE 9 MG/ML
1000 INJECTION, SOLUTION INTRAVENOUS CONTINUOUS
Status: DISCONTINUED | OUTPATIENT
Start: 2025-07-12 | End: 2025-07-12 | Stop reason: HOSPADM

## 2025-07-12 RX ORDER — ACETAMINOPHEN 500 MG
1000 TABLET ORAL
Status: COMPLETED | OUTPATIENT
Start: 2025-07-12 | End: 2025-07-12

## 2025-07-12 RX ADMIN — ACETAMINOPHEN 1000 MG: 500 TABLET, FILM COATED ORAL at 10:08

## 2025-07-12 RX ADMIN — SODIUM CHLORIDE 500 ML: 0.9 INJECTION, SOLUTION INTRAVENOUS at 10:08

## 2025-07-12 ASSESSMENT — COLUMBIA-SUICIDE SEVERITY RATING SCALE - C-SSRS
1. IN THE PAST MONTH, HAVE YOU WISHED YOU WERE DEAD OR WISHED YOU COULD GO TO SLEEP AND NOT WAKE UP?: NO
6. HAVE YOU EVER DONE ANYTHING, STARTED TO DO ANYTHING, OR PREPARED TO DO ANYTHING TO END YOUR LIFE?: NO
2. HAVE YOU ACTUALLY HAD ANY THOUGHTS OF KILLING YOURSELF IN THE PAST MONTH?: NO

## 2025-07-12 ASSESSMENT — ACTIVITIES OF DAILY LIVING (ADL)
ADLS_ACUITY_SCORE: 41

## 2025-07-12 NOTE — ED PROVIDER NOTES
History     Chief Complaint   Patient presents with    Chest Pain     HPI  Shea Hoyos is a 50 year old female who arrives by EMS with report of chest discomfort.  Prearrival patient was reported to have had a history of pericarditis.    Reviewed the medical record.  Reviewed office visit day prior patient was recently hospitalized for MRSA infection in the nose with facial cellulitis.  She does have a history of housing insecurity, bipolar 1 disorder, hypothyroidism and a history of E. coli bacteremia.  Patient's current prescribed home medications were reviewed with the patient at the bedside.    On examination patient  arrived by EMS from [facility] where she is seeking treatment for substance use disorder.  Patient reports has been in treatment for the last 30 days.  Her drug of choice is methamphetamine and she reports she typically smokes it.  Her last use was 30 days ago.  She reported that she woke up around 2:30 AM this morning with sudden substernal chest pain that did not radiate.  She rated her discomfort a 6 out of 10 she was worried that her symptoms were serious due to vomiting and feeling poorly and reported she had been recently treated for pericarditis over the last 3 months she reported no personal history of heart disease or venous thromboembolism but reported history of cardiovascular disease in her family.  She was also reporting some new skin changes in her lower extremity over the last 2 weeks with some blotchiness.  No new leg swelling.  She reports no orthopnea and has had no fainting spells or palpitations due to her symptoms she presented for further care.  EMS providers administered 325 mg aspirin and secured peripheral vascular access    Allergies:  Allergies   Allergen Reactions    Amoxicillin-Pot Clavulanate Rash     Vomiting    Cipro [Ciprofloxacin] Rash    Morphine Rash    Morphine And Codeine Rash    Vicodin [Acetaminophen] Rash    Zithromax [Azithromycin Dihydrate]  Rash       Problem List:    Patient Active Problem List    Diagnosis Date Noted    Lower abdominal adhesions 06/21/2022     Priority: Medium    Menorrhagia with regular cycle 06/21/2022     Priority: Medium    Intramural leiomyoma of uterus 11/17/2020     Priority: Medium    Hypothyroidism (acquired) 05/08/2018     Priority: Medium    Prediabetes 05/08/2018     Priority: Medium    Major depressive disorder, recurrent episode, severe with mood-congruent psychotic features (H) 11/13/2015     Priority: Medium    Post traumatic stress disorder (PTSD) 11/13/2015     Priority: Medium    Adenoma of left adrenal gland 07/31/2015     Priority: Medium    Multinodular goiter 04/19/2014     Priority: Medium    Mild intermittent asthma 02/18/2014     Priority: Medium    Migraine with aura 02/07/2013     Priority: Medium    Nerve damage 09/12/2012     Priority: Medium    Anxiety state 07/13/2012     Priority: Medium    Urinary incontinence 09/26/2011     Priority: Medium    Bipolar I disorder (H) 04/01/2011     Priority: Medium    GERD (gastroesophageal reflux disease) 04/01/2011     Priority: Medium    CARDIOVASCULAR SCREENING; LDL GOAL LESS THAN 160 10/31/2010     Priority: Medium    Adult BMI >=40 kg/sq m 02/26/2010     Priority: Medium    Tobacco abuse 02/26/2010     Priority: Medium    History of contraception 02/26/2010     Priority: Medium     Failed tubal ligation. Pt had tubal ligation in 2001. Recent pregnancy in 2009 that ended in miscarriage. Pt now using condoms and spermicide. Undecided about future fertility plans at this time, considering tubal reversal.          Past Medical History:    Past Medical History:   Diagnosis Date    Bipolar affective disorder (H)     Bronchitis     Depression     GERD (gastroesophageal reflux disease)     Increased BMI     reflux        Past Surgical History:    Past Surgical History:   Procedure Laterality Date    CHOLECYSTECTOMY, LAPOROSCOPIC      HC DILATION/CURETTAGE DIAG/THER NON  "OB      HC TOOTH EXTRACTION W/FORCEP      TUBAL LIGATION      ZZC APPENDECTOMY      ZZC  DELIVERY ONLY  , 2001    x2       Family History:    Family History   Problem Relation Age of Onset    Respiratory Mother         copd    Hypertension Mother     Allergies Mother     Diabetes Mother     Obesity Mother     Psychotic Disorder Mother         bipolar    Hypertension Father     Allergies Father     C.A.D. Father     Diabetes Father     Alcohol/Drug Father     C.A.D. Maternal Grandmother     Cerebrovascular Disease Maternal Grandmother     Alcohol/Drug Maternal Grandmother     Alzheimer Disease Maternal Grandmother     Arthritis Maternal Grandmother     Blood Disease Maternal Grandmother     C.A.D. Maternal Grandfather     Cerebrovascular Disease Maternal Grandfather     Alcohol/Drug Maternal Grandfather     C.A.D. Paternal Grandmother     Cerebrovascular Disease Paternal Grandmother     Breast Cancer Paternal Grandmother     Alcohol/Drug Paternal Grandmother     C.A.D. Paternal Grandfather     Cerebrovascular Disease Paternal Grandfather     Alcohol/Drug Paternal Grandfather     Asthma Sister     Depression Sister         bipolar    Thyroid Disease Sister     Cancer Sister         ovarian and cervical    Depression Sister         bipolar       Social History:  Marital Status:  Single [1]  Social History     Tobacco Use    Smoking status: Every Day     Current packs/day: 0.50     Average packs/day: 0.5 packs/day for 22.0 years (11.0 ttl pk-yrs)     Types: Cigarettes   Substance Use Topics    Alcohol use: Yes     Comment: occasional, holidays    Drug use: No        Medications:    IBUPROFEN 600 MG OR TABS  LEXAPRO 20 MG OR TABS  MULTIVITAMIN TABS   OR  NEXIUM 20 MG OR CPDR          Review of Systems    Physical Exam   BP: 125/77  Pulse: 76  Temp: 98.4  F (36.9  C)  Resp: 20  Height: 162.6 cm (5' 4\")  Weight: 83 kg (183 lb)  SpO2: 93 %      Physical Exam    ED Course        Procedures    Results for orders " placed during the hospital encounter of 07/12/25    POC US ECHO LIMITED    Impression  [facility] Procedure Note    Limited Bedside ED Cardiac Ultrasound:    PROCEDURE: PERFORMED BY: Dr. Donnie Cheema MD  INDICATIONS/SYMPTOM:  Chest Pain  PROBE: Cardiac phased array probe  BODY LOCATION: Chest  FINDINGS:  The ultrasound was performed utilizing the parasternal long axis views.  Cardiac contractility:  Present  Gross estimation of cardiac kinesis: normal  Pericardial Effusion:  None  RV:LV ratio: LV > RV  IVC:  Diameter:  IVC diameter expiration (IVCe) not measured  IVC diameter inspiration (IVCi) not measured  Collapsibility:  IVC collapses < 50% with inspiration  INTERPRETATION:    Chamber size and motion were grossly normal with LV > RV, normal cardiac kinesis.  No pericardial effusion was found.  IVC visualized and findings indicate normovolemia.  IMAGE DOCUMENTATION: Images were archived to PACs system.            EKG Interpretation:      Interpreted by Donnie Cheema MD  Time reviewed: 0947  Symptoms at time of EKG: Substernal chest chest pain since 0230   Rhythm: normal sinus   Rate: Normal  Axis: Normal  Ectopy: none  Conduction: normal  ST Segments/ T Waves: Non-specific ST-T wave changes  Q Waves: nonspecific  Comparison to prior: When compared with EKG dated 10/3/2024 no definite acute ischemia is appreciated old Q waves are present with nonspecific T wave changes    Clinical Impression: No ST elevation or reciprocal change, subtle T wave changes in the septum normal sinus      Critical Care time:  none     None         ED medications:  Medications   sodium chloride 0.9 % infusion (has no administration in time range)   sodium chloride 0.9% BOLUS 500 mL (500 mLs Intravenous $New Bag 7/12/25 1008)   acetaminophen (TYLENOL) tablet 1,000 mg (1,000 mg Oral $Given 7/12/25 1008)     ED Vitals:  Vitals:    07/12/25 0930 07/12/25 1000 07/12/25 1030 07/12/25 1100   BP: 125/77 122/81 118/78  "114/70   Pulse: 76 74 63 66   Resp: 20      Temp: 98.4  F (36.9  C)      TempSrc: Oral      SpO2: 93% 93% (!) 90%    Weight: 83 kg (183 lb)      Height: 1.626 m (5' 4\")           ED labs and imaging:  Results for orders placed or performed during the hospital encounter of 07/12/25   POC US ECHO LIMITED     Status: None    Impression    [facility] Procedure Note      Limited Bedside ED Cardiac Ultrasound:    PROCEDURE: PERFORMED BY: Dr. Donnie Cheema MD  INDICATIONS/SYMPTOM:  Chest Pain  PROBE: Cardiac phased array probe  BODY LOCATION: Chest  FINDINGS:   The ultrasound was performed utilizing the parasternal long axis views.  Cardiac contractility:  Present  Gross estimation of cardiac kinesis: normal  Pericardial Effusion:  None  RV:LV ratio: LV > RV  IVC:    Diameter:  IVC diameter expiration (IVCe) not measured                                                   IVC diameter inspiration (IVCi) not measured                                                       Collapsibility:  IVC collapses < 50% with inspiration  INTERPRETATION:    Chamber size and motion were grossly normal with LV > RV, normal cardiac kinesis.  No pericardial effusion was found.  IVC visualized and findings indicate normovolemia.  IMAGE DOCUMENTATION: Images were archived to PACs system.        Chest XR,  PA & LAT     Status: None    Narrative    EXAM: XR CHEST 2 VIEWS  LOCATION: [facility]  DATE: 7/12/2025    INDICATION: Acute atraumatic substernal chest discomfort. Hx of pericarditis and CHICHI  in recovery.  For acute cardiopulmonary process  COMPARISON: 10/4/2024      Impression    IMPRESSION: No consolidation. No pleural effusions or pneumothorax. Normal cardiac size.   Basic metabolic panel     Status: Abnormal   Result Value Ref Range    Sodium 136 135 - 145 mmol/L    Potassium 4.7 3.4 - 5.3 mmol/L    Chloride 102 98 - 107 mmol/L    Carbon Dioxide (CO2) 23 22 - 29 mmol/L    Anion Gap 11 7 - 15 " mmol/L    Urea Nitrogen 13.2 6.0 - 20.0 mg/dL    Creatinine 0.76 0.51 - 0.95 mg/dL    GFR Estimate >90 >60 mL/min/1.73m2    Calcium 8.8 8.8 - 10.4 mg/dL    Glucose 119 (H) 70 - 99 mg/dL   Troponin T, High Sensitivity     Status: Normal   Result Value Ref Range    Troponin T, High Sensitivity <6 <=14 ng/L   [place] Draw     Status: None    Narrative    The following orders were created for panel order [place] Draw.  Procedure                               Abnormality         Status                     ---------                               -----------         ------                     Extra Blue Top Tube[[id number]]                             Final result                 Please view results for these tests on the individual orders.   CBC with platelets and differential     Status: Abnormal   Result Value Ref Range    WBC Count 7.1 4.0 - 11.0 10e3/uL    RBC Count 4.64 3.80 - 5.20 10e6/uL    Hemoglobin 13.2 11.7 - 15.7 g/dL    Hematocrit 41.6 35.0 - 47.0 %    MCV 90 78 - 100 fL    MCH 28.4 26.5 - 33.0 pg    MCHC 31.7 31.5 - 36.5 g/dL    RDW 15.9 (H) 10.0 - 15.0 %    Platelet Count 295 150 - 450 10e3/uL    % Neutrophils 66 %    % Lymphocytes 23 %    % Monocytes 8 %    % Eosinophils 2 %    % Basophils 1 %    % Immature Granulocytes 0 %    NRBCs per 100 WBC 0 <1 /100    Absolute Neutrophils 4.7 1.6 - 8.3 10e3/uL    Absolute Lymphocytes 1.6 0.8 - 5.3 10e3/uL    Absolute Monocytes 0.6 0.0 - 1.3 10e3/uL    Absolute Eosinophils 0.1 0.0 - 0.7 10e3/uL    Absolute Basophils 0.1 0.0 - 0.2 10e3/uL    Absolute Immature Granulocytes 0.0 <=0.4 10e3/uL    Absolute NRBCs 0.0 10e3/uL   Extra Blue Top Tube     Status: None   Result Value Ref Range    Hold Specimen [facility]    D dimer quantitative     Status: Normal   Result Value Ref Range    D-Dimer Quantitative 0.27 0.00 - 0.50 ug/mL FEU    Narrative    This D-dimer assay is intended for use in conjunction with a clinical pretest probability assessment model to exclude pulmonary embolism  (PE) and deep venous thrombosis (DVT) in outpatients suspected of PE or DVT. The cut-off value is 0.50 ug/mL FEU.   Troponin T, High Sensitivity     Status: Normal   Result Value Ref Range    Troponin T, High Sensitivity <6 <=14 ng/L   EKG 12 lead     Status: None (Preliminary result)   Result Value Ref Range    Systolic Blood Pressure  mmHg    Diastolic Blood Pressure  mmHg    Ventricular Rate 76 BPM    Atrial Rate 76 BPM    OH Interval 134 ms    QRS Duration 80 ms     ms    QTc 434 ms    P Axis -7 degrees    R AXIS 16 degrees    T Axis 59 degrees    Interpretation ECG       Sinus rhythm  Septal infarct (cited on or before 03-Oct-2024)  Abnormal ECG  When compared with ECG of 03-Oct-2024 22:58,  No significant change was found     CBC with platelets differential     Status: Abnormal    Narrative    The following orders were created for panel order CBC with platelets differential.  Procedure                               Abnormality         Status                     ---------                               -----------         ------                     CBC with platelets and ...[[id number]]  Abnormal            Final result                 Please view results for these tests on the individual orders.     Assessments & Plan (with Medical Decision Making)   Assessment Summary and clinical Impression: 50-year-old female who presented by EMS with chest pain and concern about prior history of pericarditis  Patient was recently hospitalized 2 months earlier for facial cellulitis with MRSA.  She has multiple comorbidities including history of E. coli bacteremia, history of housing insecurity, hypothyroidism, bipolar 1 disorder. On intake she was captured to be afebrile blood pressure was 125/77 she was 93% on room air.  She reported sudden discomfort that woke her up from sleep at 2:30 AM this morning 7 hours before her assessment.  Pain was ongoing but did not radiate.  She rated a 6 out of 10.  She received aspirin  prior to arrival by EMS provider.  On exam she was pleasant.  In no acute distress.  Normal cardiac and lung exam.  POCUS echocardiogram revealed normal grossly preserved biventricular systolic function  without pericardial effusion given her report of prior treatment for pericarditis in the last 3 to 5 months workup was broadened to ensure her symptoms were not due to an emergent cardiopulmonary process.  Patient had petechial lesions over the medial ankle but no accompanying purpura.  Her evaluation workup revealed no acute findings specifically no evidence for acute coronary syndrome with serial negative cardiac biomarkers and negative D-dimer and normal chest x-ray.  After period of care with reassuring assessment clinical exam and history was not concerning for an acute vascular emergency including aortic dissection although not definitively excluded without advanced imaging.  Patient felt reassured by her assessment and expressed comfort going home.    ED course and plan:  Reviewed the medical record.  Reviewed hospital course on 5/21/2025, visit on 6/12/2025 for an office visit the day prior. EKG obtained on arrival and compared to EKG dated 10/3/24-no acute ischemia with septal T wave changes. Bedside POCUS echocardiogram revealed grossly preserved biventricular systolic function with no pericardial effusion or evidence of tamponade.  See images captured in PACS.  With her report of symptoms of pericarditis and prior treatment over the last 3 to 5 months currently in recovery for substance use disorder with methamphetamines and sudden chest discomfort workup was undertaken to ensure symptoms were not due to an emergent cardiopulmonary process including acute coronary.  Troponin on arrival with symptom onset 6 hours prior to ED arrival was negative and undetectable.  Normal D.dimer. Delta troponin 2 hours from arrival troponin was also negative. Chest imaging reviewed independently and the radiology report  was also reviewed with no acute cardiopulmonary findings.  Clinical suspicion for vascular emergency including aortic dissection was low given reassuring POCUS echocardiogram negative D-dimer, patient's vital signs.   After period of observation. We discussed and reviewed next steps for care given symptoms reported and her comorbidities with unrevealing workup. Patient felt reassured by her assessment and expressed comfort going home understand that the exact cause of her discomfort is not clear.  We discussed reviewed concerning symptoms including reasons to return.      Disclaimer: This note consists of symbols derived from keyboarding, dictation and/or voice recognition software. As a result, there may be errors in the script that have gone undetected. Please consider this when interpreting information found in this chart.   I have reviewed the nursing notes.    I have reviewed the findings, diagnosis, plan and need for follow up with the patient.           Medical Decision Making  The patient's presentation was of high complexity (history of substance use disorder, currently in sobriety and treatment for methamphetamine send chest discomfort, prior care for pericarditis primary).    The patient's evaluation involved:  ordering and/or review of 3+ test(s) in this encounter (frequent vital signs and telemetry, EKG, blood work, bedside POCUS echocardiogram)    The patient's management necessitated high risk (chest pain of unclear cause, low threshold to return for reassessment if there are new concerns and reevaluate with ongoing symptom).        New Prescriptions    No medications on file       Final diagnoses:   Chest pain, unspecified type - Acute onset upon awakening 0230   History of substance use disorder - Methamphetamine currently in treatment. last  use 30 days       7/12/2025   [facility] EMERGENCY DEPT       Donnie Cheema MD  07/12/25 8120

## 2025-07-12 NOTE — DISCHARGE INSTRUCTIONS
1) Your evaluation today did not reveal an emergency condition or diagnosis or definite explanation for your pain and discomfort is reported on arrival.  After  of period of care your evaluation assessment is reassuring with no findings to support blood clot, pneumonia, collapsed finding to suggest pericarditis.  With reassuring evaluation we have discussed that he appears stable for discharge to home with plan to continue to monitor symptoms.    2) We have reviewed concerning symptoms including reasons to return to be reevaluated.  If your symptoms persist consider follow-up with your care team if there are new concerns including fainting spell, fever, or any new concerns you should return to be re-evaluated.

## 2025-07-12 NOTE — ED TRIAGE NOTES
Pt here from a treatment facility for methamphetamine use 30+ days. Hx of pericarditis. CP started at 0230, described as stabbing pain. EMS gave 324 aspirin.     Triage Assessment (Adult)       Row Name 07/12/25 0931          Triage Assessment    Airway WDL WDL        Respiratory WDL    Respiratory WDL WDL        Skin Circulation/Temperature WDL    Skin Circulation/Temperature WDL WDL        Cardiac WDL    Cardiac WDL X        Peripheral/Neurovascular WDL    Peripheral Neurovascular WDL WDL        Cognitive/Neuro/Behavioral WDL    Cognitive/Neuro/Behavioral WDL WDL

## 2025-07-13 LAB
ATRIAL RATE - MUSE: 76 BPM
DIASTOLIC BLOOD PRESSURE - MUSE: NORMAL MMHG
INTERPRETATION ECG - MUSE: NORMAL
P AXIS - MUSE: -7 DEGREES
PR INTERVAL - MUSE: 134 MS
QRS DURATION - MUSE: 80 MS
QT - MUSE: 386 MS
QTC - MUSE: 434 MS
R AXIS - MUSE: 16 DEGREES
SYSTOLIC BLOOD PRESSURE - MUSE: NORMAL MMHG
T AXIS - MUSE: 59 DEGREES
VENTRICULAR RATE- MUSE: 76 BPM

## 2025-07-14 ENCOUNTER — DOCUMENTATION ONLY (OUTPATIENT)
Dept: OTHER | Facility: CLINIC | Age: 50
End: 2025-07-14
Payer: COMMERCIAL